# Patient Record
Sex: FEMALE | Race: WHITE
[De-identification: names, ages, dates, MRNs, and addresses within clinical notes are randomized per-mention and may not be internally consistent; named-entity substitution may affect disease eponyms.]

---

## 2017-10-20 NOTE — PCM.PREANE
Preanesthetic Assessment





- Procedure


Proposed Procedure: 





Intrathecal analgesia, fentanly/bupivicaine





- Anesthesia/Transfusion/Family Hx


Anesthesia History: Prior Anesthesia Without Reaction


Other Type of Anesthesia Reaction Comment: General and spinal


Family History of Anesthesia Reaction: No


Transfusion History: No Prior Transfusion(s) (Acceptable candidate for proposed 

procedure.  Risks, benefits, complications, success rate discussed.)





- Physical Assessment


Height: 1.8 m


Weight: 138.799 kg





- Lab


Values: 





 Laboratory Last Values











WBC  11.67 K/uL (4.0-11.0)  H  10/20/17  21:18    


 


RBC  4.15 M/uL (4.30-5.90)  L  10/20/17  21:18    


 


Hgb  12.4 g/dL (12.0-16.0)   10/20/17  21:18    


 


Hct  36.7 % (36.0-46.0)   10/20/17  21:18    


 


MCV  88.4 fL (80.0-98.0)   10/20/17  21:18    


 


MCH  29.9 pg (27.0-32.0)   10/20/17  21:18    


 


MCHC  33.8 g/dL (31.0-37.0)   10/20/17  21:18    


 


RDW Std Deviation  48.6 fl (28.0-62.0)   10/20/17  21:18    


 


RDW Coeff of Jackelyn  15 % (11.0-15.0)   10/20/17  21:18    


 


Plt Count  221 K/uL (150-400)   10/20/17  21:18    


 


MPV  9.70 fL (7.40-12.00)   10/20/17  21:18    


 


Add Manual Diff  YES   10/20/17  21:18    


 


Neutrophils % (Manual)  79 % (48.0-80.0)   10/20/17  21:18    


 


Band Neutrophils %  2 %  10/20/17  21:18    


 


Lymphocytes % (Manual)  13 % (16.0-40.0)  L  10/20/17  21:18    


 


Monocytes % (Manual)  4 % (0.0-15.0)   10/20/17  21:18    


 


Eosinophils % (Manual)  2 % (0.0-7.0)   10/20/17  21:18    


 


Nucleated RBC %  0.0 /100WBC  10/20/17  21:18    


 


Absolute Seg Neuts  9.2  (1.4-5.7)  H  10/20/17  21:18    


 


Band Neutrophils #  0.2   10/20/17  21:18    


 


Lymphocytes # (Manual)  1.5  (0.6-2.4)   10/20/17  21:18    


 


Monocytes # (Manual)  0.5  (0.0-0.8)   10/20/17  21:18    


 


Eosinophils # (Manual)  0.2  (0.0-0.7)   10/20/17  21:18    


 


Nucleated RBCs #  0 K/uL  10/20/17  21:18    


 


Urine Color  YELLOW   10/20/17  20:30    


 


Urine Appearance  CLEAR   10/20/17  20:30    


 


Urine pH  6.0  (5.0-8.0)   10/20/17  20:30    


 


Ur Specific Gravity  >= 1.030  (1.001-1.035)   10/20/17  20:30    


 


Urine Protein  30 mg/dL (NEGATIVE)   10/20/17  20:30    


 


Urine Glucose (UA)  NEGATIVE mg/dL (NEGATIVE)   10/20/17  20:30    


 


Urine Ketones  NEGATIVE mg/dL (NEGATIVE)   10/20/17  20:30    


 


Urine Occult Blood  LARGE  (NEGATIVE)  H  10/20/17  20:30    


 


Urine Nitrite  NEGATIVE  (NEGATIVE)   10/20/17  20:30    


 


Urine Bilirubin  NEGATIVE  (NEGATIVE)   10/20/17  20:30    


 


Urine Urobilinogen  0.2 EU/dL (<2.0)   10/20/17  20:30    


 


Ur Leukocyte Esterase  NEGATIVE  (NEGATIVE)   10/20/17  20:30    


 


Fetal Membrane Rupture  NEGATIVE   10/20/17  20:30    


 


Urine Opiates Screen  NEGATIVE  (NEGATIVE)   10/20/17  20:30    


 


Ur Oxycodone Screen  NEGATIVE  (NEGATIVE)   10/20/17  20:30    


 


Urine Methadone Screen  NEGATIVE  (NEGATIVE)   10/20/17  20:30    


 


Ur Barbiturates Screen  NEGATIVE  (NEGATIVE)   10/20/17  20:30    


 


Ur Phencyclidine Scrn  NEGATIVE  (NEGATIVE)   10/20/17  20:30    


 


Ur Amphetamine Screen  POSITIVE  (NEGATIVE)   10/20/17  20:30    


 


U Methamphetamines Scrn  POSITIVE  (NEGATIVE)   10/20/17  20:30    


 


U Benzodiazepines Scrn  NEGATIVE  (NEGATIVE)   10/20/17  20:30    


 


U Cocaine Metab Screen  NEGATIVE  (NEGATIVE)   10/20/17  20:30    


 


U Marijuana (THC) Screen  POSITIVE  (NEGATIVE)   10/20/17  20:30    


 


Blood Type  O POSITIVE   10/20/17  21:18    


 


Antibody Screen  NEGATIVE   10/20/17  21:18    














- Allergies


Allergies/Adverse Reactions: 


 Allergies











Allergy/AdvReac Type Severity Reaction Status Date / Time


 


No Known Allergies Allergy   Verified 08/06/14 12:42














PreAnesthesia Questionnaire





- Past Health History


Medical/Surgical History: Denies Medical/Surgical History


HEENT History: Reports: Other (See Below)


Other HEENT History: allergies seasonl


OB/GYN History: Reports: Pregnancy





- Infectious Disease History


Infectious Disease History: Reports: Chicken Pox





- Past Surgical History


HEENT Surgical History: Reports: Oral Surgery





- SUBSTANCE USE


Smoking Status *Q: Current Every Day Smoker


Tobacco Use Within Last Twelve Months: Cigarettes


Second Hand Smoke Exposure: Yes


Recreational Drug Use History: No





- HOME MEDS


Home Medications: 


 Home Meds





Acetaminophen [Tylenol Extra Strength] 1,000 mg PO Q4H PRN #30 tab 08/07/14 [Rx]


Benzocaine/Menthol [Dermoplast Pain Relief 20%-0.5% Spray] 78 gm TOP ASDIRECTED 

PRN #1 canister 08/07/14 [Rx]


Docusate Sodium [Colace] 100 mg PO BID PRN #30 cap 08/07/14 [Rx]


Ibuprofen [Motrin] 400 mg PO Q4H PRN #30 tablet 08/07/14 [Rx]


Witch Hazel [Tucks] 1 pad TOP ASDIRECTED PRN #1 pad 08/07/14 [Rx]











- CURRENT (IN HOUSE) MEDS


Current Meds: 





 Current Medications





Butorphanol Tartrate (Stadol)  1 mg IVPUSH Q1H PRN


   PRN Reason: Pain


Carboprost Tromethamine (Hemabate Ds)  250 mcg IM ASDIRECTED PRN


   PRN Reason: Post Partum Hemorrhage


Lactated Ringer's (Ringers, Lactated)  1,000 mls @ 150 mls/hr IV ASDIRECTED [facility]


   Last Admin: 10/20/17 21:05 Dose:  999 mls/hr


Oxytocin/Sodium Chloride (Oxytocin 30 Unit/500 Ml-Ns)  30 unit in 500 mls @ 500 

mls/hr IV TITRATE [facility]


Lidocaine HCl (Xylocaine 1%)  50 ml INJECT .ONCE PRN


   PRN Reason: Laceration repair


Methylergonovine Maleate (Methergine)  0.2 mg IM ASDIRECTED PRN


   PRN Reason: Post Partum Hemorrhage


Misoprostol (Cytotec)  200 mcg PO .ONCE PRN


   PRN Reason: Post Partum Hemorrhage


Nalbuphine HCl (Nubain)  10 mg IVPUSH Q1H PRN


   PRN Reason: Pain (severe 7-10)


Sodium Chloride (Saline Flush)  10 ml FLUSH ASDIRECTED PRN


   PRN Reason: Keep Vein Open


Sodium Chloride (Saline Flush)  2.5 ml FLUSH ASDIRECTED PRN


   PRN Reason: Keep Vein Open


Sterile Water (Sterile Water For Irrigation)  1,000 ml IRR ASDIRECTED PRN


   PRN Reason: delivery





Discontinued Medications





Fentanyl (Sublimaze) Confirm Administered Dose 100 mcg .ROUTE .STK-MED ONE


   Stop: 10/20/17 22:38


Ampicillin Sodium 2 gm/ Sodium (Chloride)  100 mls @ 200 mls/hr IV ONETIME ONE


   Stop: 10/20/17 21:29


   Last Admin: 10/20/17 21:16 Dose:  200 mls/hr











- Pre-Procedure Checklist


Attending Provider Aware: Yes


Chart Reviewed: Yes


Consent Signed: Yes


Labs Reviewed: Hematocrit, Hemoglobin, Platelet


VS/FHR Reviewed: Yes


Pt an Appropriate Candidate for the Planned Anesthesia: Yes


Alternatives and Risks of Anesthesia Discussed w Pt/Guardian: Yes


Pre-Procedure Checklist Comment: Equipment available, procedure discussed, 

fluid bolus given.





- Procedure


Procedure Start Date: 10/20/17


Procedure Start Time: 22:47


Monitors in Place: Reports: Blood Pressure, Heart Rate, SPO2


Functional IV: Yes


Bolus Infused (fluid type and amount): 1000 cc LR


Safety Measures: Reports: Patient Identified, Procedure Verified, Site Verified

, Procedure Time Out


Patient Position: Reports: Sitting


Prep: Reports: Betadine x3


Regional Placement Level: Reports: L3-4


Needle: Reports: Pencan 25g (5")


Approach: Reports: Midline


Parasthesia: Reports: None


Fluid Obtained: Reports: Cerebrospinal Fluid


Barbotage: No


Medication(s): Fentanyl 20 mcg/Marcaine 4 mg to 2 cc with csf


VS and FHR Monitored in Unit Post Placement: Yes


Procedure End Date: 10/20/17


Procedure End Time: 22:55


Procedure Comment: Tolerated well.  Pain well controlled post.

## 2017-10-21 NOTE — HP
YOB: 1980

 

PRIMARY CARE PHYSICIAN:

None PCP

 

CHIEF COMPLAINT:

Contractions.

 

HISTORY OF PRESENT ILLNESS:

Camille is a 37-year-old, G3, P2, at approximately 41 weeks gestational age by

LMP, who presents with regular contractions, intense in nature.  Denies vaginal

bleeding.  Denies leakage of fluid.  She has received no prenatal care.  She

notes good fetal movement.

 

PAST MEDICAL HISTORY:

Substance abuse.

 

PAST SURGICAL HISTORY:

Denies any surgery.

 

ALLERGIES:

No known drug allergies.

 

OB/GYN HISTORY:

G3, P2-0-0-2.  Had a spontaneous vaginal delivery of 7-pound 9-ounce male infant

in full term in 2000.  Had a spontaneous vaginal delivery of 8-pound 12-ounce

infant at 41 weeks in 2014.

 

SOCIAL HISTORY:

She is .  She is a smoker.  She drinks alcohol socially. Urine drug

screen is positive for methamphetamine, amphetamine, and marijuana.

 

FAMILY HISTORY:

Mother with bleeding disorder, primary biliary cirrhosis.  Father with coronary

artery disease.  Siblings alive and well.

 

REVIEW OF SYSTEMS:

Denies headaches, visual changes.  Denies chest pain and shortness of breath.

Denies nausea and vomiting.  Denies dysuria, urgency, and frequency.  Denies

diarrhea.  Denies abnormal vaginal discharge.

 

PHYSICAL EXAMINATION:

VITAL SIGNS:  Blood pressure 125/90, pulse is 73.

CARDIAC:  Regular rate and rhythm.

PULMONARY:  Clear to auscultation bilaterally.

ABDOMEN:  Gravid, nontender, and obese.

BACK:  No CVA tenderness.

EXTREMITIES:  Trace pedal edema.  

Contractions every 2-3 minutes.  

Cervix 7-8cm, 90% effaced, +2 station. 

 Fetal heart tones 150s with variability.

 

LABORATORY DATA:

Ultrasound reveals a fetus weighing 4413 g, cephalic presentation.  Fundal

placenta grade 3.  Normal amniotic fluid volume.  Estimated AUA of 37 weeks 5

days.  

Group beta strep is pending.  

CBC reveals normal hemoglobin and

platelets.  

UA reveals large blood.  

UDS is positive for methamphetamine,

amphetamine, and marijuana.  

Remainder of labs pending.

 

ASSESSMENT:

1. 41-week intrauterine pregnancy.

2. No prenatal care.

3. Positive urine drug screen.

4. Large-for-gestational-age fetus.

5. Group B beta strep unknown.

6. Active labor.

 

PLAN:

The patient is admitted.  Labs as above as well as usual routine initial OB

labs.  The patient has been IV hydrated.  She is receiving ampicillin

prophylaxis for group beta strep unknown.  The patient is requesting regional

anesthesia.  Anesthesia has been consulted.  They will attempt to do

intrathecal.  Expectant vaginal delivery.  We will be planning a Social Service

consult.  Pediatrician has been notified of impending delivery.

 

 

MARY / DOMINIC

DD:  10/20/2017 22:42:14

DT:  10/21/2017 00:42:18

Job #:  [id number]

MTDD

## 2017-10-21 NOTE — PCM.PNPP
- General Info


Date of Service: 10/21/17


Functional Status: Reports: Pain Controlled, Tolerating Diet, Ambulating, 

Urinating





- Review of Systems


General: Denies: Fever, Weakness


Pulmonary: Denies: Shortness of Breath


Cardiovascular: Denies: Chest Pain, Palpitations, Lightheadedness


Gastrointestinal: Denies: Nausea, Vomiting


Genitourinary: Denies: Flank Pain


Neurological: Reports: No Symptoms





- General Info


Date of Service: 10/21/17





- Patient Data


Vital Signs - Most Recent: 


 Last Vital Signs











Temp  36.5 C   10/21/17 04:00


 


Pulse  99   10/21/17 04:00


 


Resp  18   10/21/17 04:00


 


BP  119/76   10/21/17 04:00


 


Pulse Ox  96   10/21/17 04:00











Weight - Most Recent: 138.799 kg


Lab Results - Last 24 Hours: 


 Laboratory Results - last 24 hr











  10/20/17 10/20/17 10/20/17 Range/Units





  20:30 20:30 20:30 


 


WBC     (4.0-11.0)  K/uL


 


RBC     (4.30-5.90)  M/uL


 


Hgb     (12.0-16.0)  g/dL


 


Hct     (36.0-46.0)  %


 


MCV     (80.0-98.0)  fL


 


MCH     (27.0-32.0)  pg


 


MCHC     (31.0-37.0)  g/dL


 


RDW Std Deviation     (28.0-62.0)  fl


 


RDW Coeff of Jackelyn     (11.0-15.0)  %


 


Plt Count     (150-400)  K/uL


 


MPV     (7.40-12.00)  fL


 


Add Manual Diff     


 


Neutrophils % (Manual)     (48.0-80.0)  %


 


Band Neutrophils %     %


 


Lymphocytes % (Manual)     (16.0-40.0)  %


 


Monocytes % (Manual)     (0.0-15.0)  %


 


Eosinophils % (Manual)     (0.0-7.0)  %


 


Nucleated RBC %     /100WBC


 


Absolute Seg Neuts     (1.4-5.7)  


 


Band Neutrophils #     


 


Lymphocytes # (Manual)     (0.6-2.4)  


 


Monocytes # (Manual)     (0.0-0.8)  


 


Eosinophils # (Manual)     (0.0-0.7)  


 


Nucleated RBCs #     K/uL


 


Urine Color  YELLOW    


 


Urine Appearance  CLEAR    


 


Urine pH  6.0    (5.0-8.0)  


 


Ur Specific Gravity  >= 1.030    (1.001-1.035)  


 


Urine Protein  30    (NEGATIVE)  mg/dL


 


Urine Glucose (UA)  NEGATIVE    (NEGATIVE)  mg/dL


 


Urine Ketones  NEGATIVE    (NEGATIVE)  mg/dL


 


Urine Occult Blood  LARGE H    (NEGATIVE)  


 


Urine Nitrite  NEGATIVE    (NEGATIVE)  


 


Urine Bilirubin  NEGATIVE    (NEGATIVE)  


 


Urine Urobilinogen  0.2    (<2.0)  EU/dL


 


Ur Leukocyte Esterase  NEGATIVE    (NEGATIVE)  


 


Fetal Membrane Rupture    NEGATIVE  


 


Urine Opiates Screen   NEGATIVE   (NEGATIVE)  


 


Ur Oxycodone Screen   NEGATIVE   (NEGATIVE)  


 


Urine Methadone Screen   NEGATIVE   (NEGATIVE)  


 


Ur Barbiturates Screen   NEGATIVE   (NEGATIVE)  


 


Ur Phencyclidine Scrn   NEGATIVE   (NEGATIVE)  


 


Ur Amphetamine Screen   POSITIVE   (NEGATIVE)  


 


U Methamphetamines Scrn   POSITIVE   (NEGATIVE)  


 


U Benzodiazepines Scrn   NEGATIVE   (NEGATIVE)  


 


U Cocaine Metab Screen   NEGATIVE   (NEGATIVE)  


 


U Marijuana (THC) Screen   POSITIVE   (NEGATIVE)  


 


Blood Type     


 


Antibody Screen     














  10/20/17 10/20/17 Range/Units





  21:18 21:18 


 


WBC   11.67 H  (4.0-11.0)  K/uL


 


RBC   4.15 L  (4.30-5.90)  M/uL


 


Hgb   12.4  (12.0-16.0)  g/dL


 


Hct   36.7  (36.0-46.0)  %


 


MCV   88.4  (80.0-98.0)  fL


 


MCH   29.9  (27.0-32.0)  pg


 


MCHC   33.8  (31.0-37.0)  g/dL


 


RDW Std Deviation   48.6  (28.0-62.0)  fl


 


RDW Coeff of Jackelyn   15  (11.0-15.0)  %


 


Plt Count   221  (150-400)  K/uL


 


MPV   9.70  (7.40-12.00)  fL


 


Add Manual Diff   YES  


 


Neutrophils % (Manual)   79  (48.0-80.0)  %


 


Band Neutrophils %   2  %


 


Lymphocytes % (Manual)   13 L  (16.0-40.0)  %


 


Monocytes % (Manual)   4  (0.0-15.0)  %


 


Eosinophils % (Manual)   2  (0.0-7.0)  %


 


Nucleated RBC %   0.0  /100WBC


 


Absolute Seg Neuts   9.2 H  (1.4-5.7)  


 


Band Neutrophils #   0.2  


 


Lymphocytes # (Manual)   1.5  (0.6-2.4)  


 


Monocytes # (Manual)   0.5  (0.0-0.8)  


 


Eosinophils # (Manual)   0.2  (0.0-0.7)  


 


Nucleated RBCs #   0  K/uL


 


Urine Color    


 


Urine Appearance    


 


Urine pH    (5.0-8.0)  


 


Ur Specific Gravity    (1.001-1.035)  


 


Urine Protein    (NEGATIVE)  mg/dL


 


Urine Glucose (UA)    (NEGATIVE)  mg/dL


 


Urine Ketones    (NEGATIVE)  mg/dL


 


Urine Occult Blood    (NEGATIVE)  


 


Urine Nitrite    (NEGATIVE)  


 


Urine Bilirubin    (NEGATIVE)  


 


Urine Urobilinogen    (<2.0)  EU/dL


 


Ur Leukocyte Esterase    (NEGATIVE)  


 


Fetal Membrane Rupture    


 


Urine Opiates Screen    (NEGATIVE)  


 


Ur Oxycodone Screen    (NEGATIVE)  


 


Urine Methadone Screen    (NEGATIVE)  


 


Ur Barbiturates Screen    (NEGATIVE)  


 


Ur Phencyclidine Scrn    (NEGATIVE)  


 


Ur Amphetamine Screen    (NEGATIVE)  


 


U Methamphetamines Scrn    (NEGATIVE)  


 


U Benzodiazepines Scrn    (NEGATIVE)  


 


U Cocaine Metab Screen    (NEGATIVE)  


 


U Marijuana (THC) Screen    (NEGATIVE)  


 


Blood Type  O POSITIVE   


 


Antibody Screen  NEGATIVE   











Med Orders - Current: 


 Current Medications





Acetaminophen (Tylenol Extra Strength)  500 mg PO Q4H PRN


   PRN Reason: Pain


Acetaminophen (Tylenol Extra Strength)  1,000 mg PO Q4H PRN


   PRN Reason: Pain


Benzocaine/Menthol (Dermoplast Pain Relief 20%-0.5% Spray)  78 gm TOP 

ASDIRECTED PRN


   PRN Reason: Perineal Comfort Measure


   Last Admin: 10/21/17 03:58 Dose:  1 spray


Bisacodyl (Dulcolax)  10 mg RECTAL .ONCE PRN


   PRN Reason: Constipation


Carboprost Tromethamine (Hemabate Ds)  250 mcg IM ASDIRECTED PRN


   PRN Reason: Post Partum Hemorrhage


Docusate Sodium (Colace)  100 mg PO BID PRN


   PRN Reason: Constipation


Emollient Ointment (Lansinoh Hpa)  0 gm TOP ASDIRECTED PRN


   PRN Reason: Sore Nipples


Lactated Ringer's (Ringers, Lactated)  1,000 mls @ 150 mls/hr IV ASDIRECTED [facility]


   Last Admin: 10/20/17 21:05 Dose:  999 mls/hr


Oxytocin/Sodium Chloride (Oxytocin 30 Unit/500 Ml-Ns)  30 unit in 500 mls @ 500 

mls/hr IV TITRATE [facility]


Ibuprofen (Motrin)  400 mg PO Q4H PRN


   PRN Reason: Pain


Ibuprofen (Motrin)  800 mg PO Q6H PRN


   PRN Reason: Pain


Methylergonovine Maleate (Methergine)  0.2 mg IM ASDIRECTED PRN


   PRN Reason: Post Partum Hemorrhage


Sodium Chloride (Saline Flush)  10 ml FLUSH ASDIRECTED PRN


   PRN Reason: Keep Vein Open


Sodium Chloride (Saline Flush)  2.5 ml FLUSH ASDIRECTED PRN


   PRN Reason: Keep Vein Open


Witch Hazel (Tucks)  1 pad TOP ASDIRECTED PRN


   PRN Reason: comfort care





Discontinued Medications





Butorphanol Tartrate (Stadol)  1 mg IVPUSH Q1H PRN


   PRN Reason: Pain


Fentanyl (Sublimaze) Confirm Administered Dose 100 mcg .ROUTE .STK-MED ONE


   Stop: 10/20/17 22:38


Ampicillin Sodium 2 gm/ Sodium (Chloride)  100 mls @ 200 mls/hr IV ONETIME ONE


   Stop: 10/20/17 21:29


   Last Admin: 10/20/17 21:16 Dose:  200 mls/hr


Lidocaine HCl (Xylocaine 1%)  50 ml INJECT .ONCE PRN


   PRN Reason: Laceration repair


Misoprostol (Cytotec)  200 mcg PO .ONCE PRN


   PRN Reason: Post Partum Hemorrhage


Nalbuphine HCl (Nubain)  10 mg IVPUSH Q1H PRN


   PRN Reason: Pain (severe 7-10)


Oxytocin (Pitocin) Confirm Administered Dose 10 unit .ROUTE .STK-MED ONE


   Stop: 10/21/17 00:29


Oxytocin (Pitocin)  10 unit IM ONETIME ONE


   Stop: 10/21/17 00:26


   Last Admin: 10/21/17 00:31 Dose:  10 unit


Sterile Water (Sterile Water For Irrigation)  1,000 ml IRR ASDIRECTED PRN


   PRN Reason: delivery











- Infant Interaction


Support Person: 





- Postpartum Recovery Exam


Fundal Tone: Firm


Fundal Level: At Umbilicus


Fundal Placement: Midline


Lochia Amount: Small


Lochia Color: Rubra/Red


Bladder Status: Voiding


Urinary Elimination: Voided





- Exam


General: Alert, Oriented


Lungs: Normal Respiratory Effort


Cardiovascular: Regular Rate, Regular Rhythm


GI/Abdominal Exam: Soft


Extremities: Pedal Edema


Skin: Dry, Intact


Psy/Mental Status: Alert





- Problem List & Annotations


(1) Vaginal delivery


SNOMED Code(s): 105646713


   Code(s): O80 - ENCOUNTER FOR FULL-TERM UNCOMPLICATED DELIVERY   Status: 

Acute   Current Visit: No   





- Problem List Review


Problem List Initiated/Reviewed/Updated: Yes





- My Orders


Last 24 Hours: 


My Active Orders





10/20/17 20:19


Patient Status [ADT] Routine 


Fetal Non Stress Test [RC] PER UNIT ROUTINE 


Up ad Amada [RC] ASDIRECTED 


Vaginal Exam [RC] Click to Edit 


Vital Signs [RC] PER UNIT ROUTINE 


Resuscitation Status Routine 





10/20/17 20:51


OB Ltd 1 or More Fetus [US] Urgent 


Carboprost Tromethamine [Hemabate DS]   250 mcg IM ASDIRECTED PRN 


Methylergonovine [Methergine]   0.2 mg IM ASDIRECTED PRN 


Sodium Chloride 0.9% [Saline Flush]   10 ml FLUSH ASDIRECTED PRN 


Sodium Chloride 0.9% [Saline Flush]   2.5 ml FLUSH ASDIRECTED PRN 


OB Panel [OM.PC] Urgent 





10/20/17 20:52


Patient Status [ADT] Routine 


Fetal Heart Tones [RC] CONTINUOUS 


Fetal Non Stress Test [RC] PER UNIT ROUTINE 


May Shower [RC] ASDIRECTED 


Notify Provider [RC] PRN 


Up ad Amada [RC] ASDIRECTED 


Vaginal Exam [RC] PRN 


Vital Signs [RC] PER UNIT ROUTINE 


Peripheral IV Insertion Adult [OM.PC] Routine 





10/20/17 21:00


Lactated Ringers [Ringers, Lactated] 1,000 ml IV ASDIRECTED 


Oxytocin/0.9 % Sodium Chloride [Oxytocin 30 Unit/500 ML-NS] 30 unit in 500 ml 

IV TITRATE 





10/20/17 21:10


CULTURE GROUP B STREP [RM] Routine 





10/20/17 21:18


HEPATITIS B SURFACE ANTIGEN [REF] Routine 


RPR [REF] Routine 


RUBELLA ANTIBODY, IGG [REF] Routine 





10/21/17 00:31


Acetaminophen [Tylenol Extra Strength]   1,000 mg PO Q4H PRN 


Acetaminophen [Tylenol Extra Strength]   500 mg PO Q4H PRN 


Benzocaine/Menthol [Dermoplast Pain Relief 20%-0.5% Spray]   78 gm TOP 

ASDIRECTED PRN 


Bisacodyl [Dulcolax]   10 mg RECTAL .ONCE PRN 


Docusate Sodium [Colace]   100 mg PO BID PRN 


Ibuprofen [Motrin]   400 mg PO Q4H PRN 


Ibuprofen [Motrin]   800 mg PO Q6H PRN 


Lanolin [Lansinoh HPA]   See Dose Instructions  TOP ASDIRECTED PRN 


Witch Hazel [Tucks]   1 pad TOP ASDIRECTED PRN 





10/21/17 00:32


Patient Status [ADT] Routine 


May Shower [RC] ASDIRECTED 


Up ad Amada [RC] ASDIRECTED 


Vital Signs [RC] PER UNIT ROUTINE 


Assess Lochia [WOMSER] Per Unit Routine 


Assess Uterine Involution [WOMSER] Per Unit Routine 


Ice Therapy [OM.PC] Per Unit Routine 


Perineal Care [OM.PC] Per Unit Routine 


Peripheral IV Discontinue [OM.PC] Routine 


Sitz Bath [OM.PC] Per Unit Routine 





10/21/17 06:47


Consult to  [CONS] Routine 





10/21/17 15:00


HEMOGLOBIN/HEMATOCRIT,HH [HEME] Routine 





10/21/17 Breakfast


Regular Diet [DIET] 














- Assessment


Assessment:: 





Status post 


No prenatal care


+ UDS for methamphetamine, amphetamine, marijuana





- Plan


Plan:: 





Continue PP cares, explained to patient about  being contacted 

given positive UDS. She voices her understanding.

## 2017-10-21 NOTE — OR
SURGEON:

Betsy Bahena M.D.

 

DATE OF PROCEDURE:  10/21/2017

 

PREOPERATIVE DIAGNOSES:

1. 41-week intrauterine pregnancy.

2. Active labor.

3. No prenatal care.

4. Group beta Strep unknown.

5. Positive urine drug screen.

6. Meconium stained fluid

7.LGA fetus

 

POSTOPERATIVE DIAGNOSES:

1. 41-week intrauterine pregnancy.

2. Active labor.

3. No prenatal care.

4. Group beta Strep unknown.

5. Positive urine drug screen.

6. Meconium stained fluid

7. LGA fetus

 

PROCEDURE:

1. Spontaneous vaginal delivery.

2. First-degree midline laceration repaired.

 

ESTIMATED BLOOD LOSS:

350 mL.

 

ANESTHESIA:

Intrathecal.

 

FINDINGS:

Viable female, Apgar score 7 at 1 minute, 8 at 5 minutes.  Weight of 4420 g.

Spontaneous delivery, intact placenta, 3-vessel cord.

 

DISPOSITION:

Mom in LDRP and infant in  nursery.

 

PROCEDURE IN DETAIL:

Camille is a 37-year-old, G3, P2-0-0-2, at approximately 41 weeks gestational age

by LMP, who presented this evening in active labor with no prenatal care.

 

On initial examination, the patient was found to be 7 cm, 100% effaced, -2

station.  She was admitted.  Routine labs were drawn as well as the full OB

panel and urine drug screen which did reveal amphetamine, methamphetamine, and

marijuana.

 

The patient is group beta Strep unknown.  Group B strep is collected and she is

initiated on ampicillin prophylaxis.

 

With IV hydration, the patient was requesting regional anesthesia and underwent

an intrathecal successfully.  Fetal heart tones were in the 150s with

variability.  After being more comfortable, the patient had amniotomy performed.

Meconium-stained fluid was noted.  At that time, she was found to be roughly 7-8

cm, 90% effaced, and -2 station.

 

The patient progressed over the next 2 hours to complete, 100% effaced, and +1

station.  She was found to be OP.  Head was gently rotated to DEEPALI position.  She

began pushing efforts, pushed readily to a +3 station.  Ultrasound had been

performed at the time of admission, and it did reveal cephalic presentation with

no evidence of previa, estimated fetal weight of 4450 g, estimated gestational

age of 37 weeks 5 days.

 

The patient continued to push and delivered infant's head atraumatically

spontaneously, followed by anterior shoulder, posterior shoulder, and the

remainder of the body.  The infant's oropharynx and nares were bulb suctioned.

DeLee suction was also performed.  Cord clamped x2 and cut.  Infant was handed

off to attending physician, Dr. Galan.

 

Cord arterial, cord venous, cord blood sampling were obtained.  Light pressure

was applied.  Vigorous fundal uterine massage was then applied while 30 units of

Pitocin was delivered in 500 mL of IV fluid.

 

Upon inspection of the cervix, vaginal sidewalls, and perineum, there was found

to be a first-degree midline laceration repaired using 2-0 Vicryl.

 

Uterus remained firm.  Sponge count and needle counts were correct.  The patient

remained in LDRP and the infant to  nursery.

 

 

MARY ALFARO

DD:  10/21/2017 00:53:24

DT:  10/21/2017 01:25:31

Job #:  [id number]

MTDD

## 2017-10-21 NOTE — [id number]
Post Anesthesia Note





- EVALUATION WITHIN 48HRS OF ANESTHETIC


Vital Signs in Normal Range: Yes


Patient Participated in Evaluation: Yes


Respiratory Function Stable: Yes


Airway Patent: Yes


Cardiovascular Function Stable: Yes


Hydration Status Stable: Yes


Pain Control Satisfactory: Yes


Nausea and Vomiting Control Satisfactory: Yes


Mental Status Recovered: Yes





- COMMENTS/OBSERVATIONS


Free Text/Narrative:: 





Did well, no problems noted post.

## 2017-10-22 NOTE — US
EXAM DATE: 10/21/17



PATIENT'S AGE: 37





Patient: MELANIE DUDLEY



Facility: [place], [place]

Patient ID: [id number]

Site Patient ID: [id number].

Site Accession #: [id number]

: 1980

Study: US OB Pelvis [id number]/20/2017 10:08:15 PM

Ordering Physician: Josef Meyer



Final Report: 

HISTORY:

No prenatal care. Patient in labor. Check estimated fetal weight and position.



FINDINGS:

Multiple grayscale static images from a limited OB ultrasound demonstrates a 
single viable intrauterine is in cephalic presentation. The placenta is fundal 
and appears mature grade 3 without evidence of previa. The amniotic fluid index 
is 21.1 cm.

Cardiac rate is 170 beats per minute.

Fetal biometry demonstrates a BPD 8.9 cm, 36 weeks 0 days. Head circumference 
31.8 cm, 35 weeks 6 days. All circumference 40.7 cm, of range for dates. Femur 
length is 8.0 cm, 41 weeks 1 day. The estimated gestational age by ultrasound 
is 37 weeks 5 days with estimated date of delivery of 2017. The 
estimated fetal weight is 4,413 +/- 644 g or 9 pounds 12 ounces. 



IMPRESSION:

1. Single viable intrauterine fetus in cephalic presentation.

2. Amniotic fluid index 21.1 cm.

3. Mature fundal placenta without previa.

4. Estimated gestational age is 37 weeks 5 days. Estimated fetal weight is 4,
413 g or 9 pounds 12 ounces.



Dictated by Aminta Clay MD @ 10/20/2017 10:23:45 PM





Dictated by: Aminta Clay MD @ 10/20/2017 22:24:12

(Electronic Signature)



Report Signed by Proxy.
KHUSHI

## 2017-10-22 NOTE — PCM.PNPP
- General Info


Date of Service: 10/22/17


Subjective Update: 





Patient denies pain. Ambulating, voiding, lochia is minimal. She has visited 

with .  She has agreed to evaluation/treatment. She would like 

to be discharged with rooming in. 


Functional Status: Reports: Pain Controlled, Tolerating Diet, Ambulating, 

Urinating





- Review of Systems


General: Denies: Weakness


Pulmonary: Denies: Shortness of Breath, Wheezing


Cardiovascular: Denies: Palpitations, Lightheadedness


Gastrointestinal: Denies: Abdominal Pain


Genitourinary: Denies: Flank Pain


Psychiatric: Reports: Agitation (mild, but present)





- General Info


Date of Service: 10/22/17





- Patient Data


Vital Signs - Most Recent: 


 Last Vital Signs











Temp  36.9 C   10/22/17 04:20


 


Pulse  73   10/22/17 04:20


 


Resp  16   10/22/17 04:20


 


BP  120/64   10/22/17 04:20


 


Pulse Ox  97   10/22/17 04:20











Weight - Most Recent: 138.799 kg


Lab Results - Last 24 Hours: 


 Laboratory Results - last 24 hr











  10/21/17 Range/Units





  14:52 


 


Hgb  11.2 L  (12.0-16.0)  g/dL


 


Hct  33.3 L  (36.0-46.0)  %











Med Orders - Current: 


 Current Medications





Acetaminophen (Tylenol Extra Strength)  500 mg PO Q4H PRN


   PRN Reason: Pain


Acetaminophen (Tylenol Extra Strength)  1,000 mg PO Q4H PRN


   PRN Reason: Pain


Benzocaine/Menthol (Dermoplast Pain Relief 20%-0.5% Spray)  78 gm TOP 

ASDIRECTED PRN


   PRN Reason: Perineal Comfort Measure


   Last Admin: 10/21/17 03:58 Dose:  1 spray


Bisacodyl (Dulcolax)  10 mg RECTAL .ONCE PRN


   PRN Reason: Constipation


Carboprost Tromethamine (Hemabate Ds)  250 mcg IM ASDIRECTED PRN


   PRN Reason: Post Partum Hemorrhage


Docusate Sodium (Colace)  100 mg PO BID PRN


   PRN Reason: Constipation


Emollient Ointment (Lansinoh Hpa)  0 gm TOP ASDIRECTED PRN


   PRN Reason: Sore Nipples


Lactated Ringer's (Ringers, Lactated)  1,000 mls @ 150 mls/hr IV ASDIRECTED CONCHITA


   Last Admin: 10/20/17 21:05 Dose:  999 mls/hr


Oxytocin/Sodium Chloride (Oxytocin 30 Unit/500 Ml-Ns)  30 unit in 500 mls @ 500 

mls/hr IV TITRATE CONCHITA


Ibuprofen (Motrin)  400 mg PO Q4H PRN


   PRN Reason: Pain


Ibuprofen (Motrin)  800 mg PO Q6H PRN


   PRN Reason: Pain


Methylergonovine Maleate (Methergine)  0.2 mg IM ASDIRECTED PRN


   PRN Reason: Post Partum Hemorrhage


Sodium Chloride (Saline Flush)  10 ml FLUSH ASDIRECTED PRN


   PRN Reason: Keep Vein Open


Sodium Chloride (Saline Flush)  2.5 ml FLUSH ASDIRECTED PRN


   PRN Reason: Keep Vein Open


Witch Hazel (Tucks)  1 pad TOP ASDIRECTED PRN


   PRN Reason: comfort care





Discontinued Medications





Butorphanol Tartrate (Stadol)  1 mg IVPUSH Q1H PRN


   PRN Reason: Pain


Fentanyl (Sublimaze) Confirm Administered Dose 100 mcg .ROUTE .STK-MED ONE


   Stop: 10/20/17 22:38


   Last Admin: 10/21/17 09:19 Dose:  Not Given


Ampicillin Sodium 2 gm/ Sodium (Chloride)  100 mls @ 200 mls/hr IV ONETIME ONE


   Stop: 10/20/17 21:29


   Last Admin: 10/20/17 21:16 Dose:  200 mls/hr


Lidocaine HCl (Xylocaine 1%)  50 ml INJECT .ONCE PRN


   PRN Reason: Laceration repair


Misoprostol (Cytotec)  200 mcg PO .ONCE PRN


   PRN Reason: Post Partum Hemorrhage


Nalbuphine HCl (Nubain)  10 mg IVPUSH Q1H PRN


   PRN Reason: Pain (severe 7-10)


Oxytocin (Pitocin) Confirm Administered Dose 10 unit .ROUTE .STK-MED ONE


   Stop: 10/21/17 00:29


   Last Admin: 10/21/17 09:20 Dose:  Not Given


Oxytocin (Pitocin)  10 unit IM ONETIME ONE


   Stop: 10/21/17 00:26


   Last Admin: 10/21/17 00:31 Dose:  10 unit


Sterile Water (Sterile Water For Irrigation)  1,000 ml IRR ASDIRECTED PRN


   PRN Reason: delivery











- Infant Interaction


Support Person: 





- Postpartum Recovery Exam


Fundal Tone: Firm


Fundal Level: At Umbilicus


Fundal Placement: Midline


Lochia Amount: Scant


Lochia Color: Rubra/Red


Perineum Description: Intact, Minimal Bruising/Swelling


Other Perinuem Description: 1st degree laceration


Bladder Status: Voiding


Urinary Elimination: Voided





- Exam


Lungs: Normal Respiratory Effort


Cardiovascular: Regular Rate, Regular Rhythm


GI/Abdominal Exam: Soft, Non-Tender


Extremities: Pedal Edema (trace)


Skin: Warm, Dry


Psy/Mental Status: Alert, Agitated





- Problem List & Annotations


(1) Vaginal delivery


SNOMED Code(s): 840710376


   Code(s): O80 - ENCOUNTER FOR FULL-TERM UNCOMPLICATED DELIVERY   Status: 

Acute   Current Visit: No   





- Problem List Review


Problem List Initiated/Reviewed/Updated: Yes





- My Orders


Last 24 Hours: 


My Active Orders





10/22/17 08:00


Ready for Discharge [RC] PER UNIT ROUTINE 














- Assessment


Assessment:: 





Status post 


No prenatal care


+ UDS for methamphetamine, amphetamine, marijuana





- Plan


Plan:: 





 has been in to evaluate patient. Will allow discharge with 

rooming in.  Infant will remain admitted.  Discharge instructions reviewed. 

Follow up at [facility] 6 weeks.  Follow up with  as 

instructed.

## 2022-07-11 ENCOUNTER — HOSPITAL ENCOUNTER (EMERGENCY)
Dept: HOSPITAL 56 - MW.ED | Age: 42
Discharge: HOME | End: 2022-07-11
Payer: MEDICAID

## 2022-07-11 ENCOUNTER — HOSPITAL ENCOUNTER (EMERGENCY)
Dept: HOSPITAL 56 - MW.ED | Age: 42
Discharge: NURSING, REHAB OR LONG-TERM CARE | End: 2022-07-11
Payer: MEDICAID

## 2022-07-11 VITALS — SYSTOLIC BLOOD PRESSURE: 143 MMHG | HEART RATE: 77 BPM | DIASTOLIC BLOOD PRESSURE: 75 MMHG

## 2022-07-11 VITALS — SYSTOLIC BLOOD PRESSURE: 143 MMHG | DIASTOLIC BLOOD PRESSURE: 88 MMHG | HEART RATE: 81 BPM

## 2022-07-11 DIAGNOSIS — K04.7: Primary | ICD-10-CM

## 2022-07-11 DIAGNOSIS — K12.2: Primary | ICD-10-CM

## 2022-07-11 DIAGNOSIS — Z79.899: ICD-10-CM

## 2022-07-11 DIAGNOSIS — K04.7: ICD-10-CM

## 2022-07-11 LAB
BUN SERPL-MCNC: 12 MG/DL (ref 7–18)
CHLORIDE SERPL-SCNC: 101 MMOL/L (ref 98–107)
CO2 SERPL-SCNC: 22.9 MMOL/L (ref 21–32)
EGFRCR SERPLBLD CKD-EPI 2021: 82 ML/MIN (ref 60–?)
GLUCOSE SERPL-MCNC: 106 MG/DL (ref 74–106)
POTASSIUM SERPL-SCNC: 3.9 MMOL/L (ref 3.5–5.1)
SODIUM SERPL-SCNC: 137 MMOL/L (ref 136–145)

## 2022-07-11 PROCEDURE — 87040 BLOOD CULTURE FOR BACTERIA: CPT

## 2022-07-11 PROCEDURE — 99283 EMERGENCY DEPT VISIT LOW MDM: CPT

## 2022-07-11 PROCEDURE — 96365 THER/PROPH/DIAG IV INF INIT: CPT

## 2022-07-11 PROCEDURE — 85025 COMPLETE CBC W/AUTO DIFF WBC: CPT

## 2022-07-11 PROCEDURE — 70491 CT SOFT TISSUE NECK W/DYE: CPT

## 2022-07-11 PROCEDURE — 99285 EMERGENCY DEPT VISIT HI MDM: CPT

## 2022-07-11 PROCEDURE — 83605 ASSAY OF LACTIC ACID: CPT

## 2022-07-11 PROCEDURE — 36415 COLL VENOUS BLD VENIPUNCTURE: CPT

## 2022-07-11 PROCEDURE — 96375 TX/PRO/DX INJ NEW DRUG ADDON: CPT

## 2022-07-11 PROCEDURE — 80053 COMPREHEN METABOLIC PANEL: CPT

## 2022-08-22 ENCOUNTER — HOSPITAL ENCOUNTER (EMERGENCY)
Dept: HOSPITAL 56 - MW.ED | Age: 42
Discharge: HOME | End: 2022-08-22
Payer: MEDICAID

## 2022-08-22 VITALS — HEART RATE: 65 BPM | DIASTOLIC BLOOD PRESSURE: 65 MMHG | SYSTOLIC BLOOD PRESSURE: 145 MMHG

## 2022-08-22 DIAGNOSIS — Z79.899: ICD-10-CM

## 2022-08-22 DIAGNOSIS — K08.89: Primary | ICD-10-CM

## 2022-08-22 LAB
BUN SERPL-MCNC: 13 MG/DL (ref 7–18)
CHLORIDE SERPL-SCNC: 104 MMOL/L (ref 98–107)
CO2 SERPL-SCNC: 24.7 MMOL/L (ref 21–32)
EGFRCR SERPLBLD CKD-EPI 2021: 82 ML/MIN (ref 60–?)
GLUCOSE SERPL-MCNC: 96 MG/DL (ref 74–106)
POTASSIUM SERPL-SCNC: 4.1 MMOL/L (ref 3.5–5.1)
SODIUM SERPL-SCNC: 139 MMOL/L (ref 136–145)

## 2022-08-22 PROCEDURE — 99283 EMERGENCY DEPT VISIT LOW MDM: CPT

## 2022-08-22 PROCEDURE — 36415 COLL VENOUS BLD VENIPUNCTURE: CPT

## 2022-08-22 PROCEDURE — 80053 COMPREHEN METABOLIC PANEL: CPT

## 2022-08-22 PROCEDURE — 85025 COMPLETE CBC W/AUTO DIFF WBC: CPT

## 2022-08-22 PROCEDURE — 70491 CT SOFT TISSUE NECK W/DYE: CPT
